# Patient Record
Sex: FEMALE | Race: ASIAN | NOT HISPANIC OR LATINO | URBAN - METROPOLITAN AREA
[De-identification: names, ages, dates, MRNs, and addresses within clinical notes are randomized per-mention and may not be internally consistent; named-entity substitution may affect disease eponyms.]

---

## 2020-04-06 ENCOUNTER — EMERGENCY (EMERGENCY)
Facility: HOSPITAL | Age: 55
LOS: 1 days | Discharge: HOME | End: 2020-04-06
Attending: ORTHOPAEDIC SURGERY
Payer: MEDICAID

## 2020-04-06 VITALS
HEART RATE: 98 BPM | TEMPERATURE: 101 F | OXYGEN SATURATION: 97 % | SYSTOLIC BLOOD PRESSURE: 152 MMHG | DIASTOLIC BLOOD PRESSURE: 93 MMHG | RESPIRATION RATE: 19 BRPM

## 2020-04-06 VITALS
HEART RATE: 90 BPM | TEMPERATURE: 100 F | SYSTOLIC BLOOD PRESSURE: 153 MMHG | RESPIRATION RATE: 18 BRPM | WEIGHT: 132.94 LBS | HEIGHT: 64 IN | DIASTOLIC BLOOD PRESSURE: 90 MMHG | OXYGEN SATURATION: 98 %

## 2020-04-06 PROCEDURE — 99284 EMERGENCY DEPT VISIT MOD MDM: CPT

## 2020-04-06 PROCEDURE — 99282 EMERGENCY DEPT VISIT SF MDM: CPT

## 2020-04-06 RX ORDER — ACETAMINOPHEN 500 MG
325 TABLET ORAL ONCE
Refills: 0 | Status: COMPLETED | OUTPATIENT
Start: 2020-04-06 | End: 2020-04-06

## 2020-04-06 RX ADMIN — Medication 325 MILLIGRAM(S): at 17:30

## 2020-04-06 NOTE — ED PROVIDER NOTE - CLINICAL SUMMARY MEDICAL DECISION MAKING FREE TEXT BOX
The patient does not have high-risk features of a life-threatening infection (COVID or otherwise). oxygen saturation is above 92 on Room air while resting as well as while ambulating. there are no signs of  impending respiratory failure.  Myself or the RN has had a long conversation with the patient regarding the reasons to return to the Emergency Department including but not limited to: worsening cough, shortness of breath, syncope, near-syncope, chest pain or any other concerns.   Patient was counseled extensively on self-quarantine protocol, hand washing, covering cough, cleansing of regularly used surfaces, return precautions, and supportive care measures to be taken.     Condolences given for family member who just

## 2020-04-06 NOTE — ED PROVIDER NOTE - NS ED ROS FT
GENERAL: +fever +malaise  Eyes: no visual changes  HEENT: No trouble swallowing or speaking  CARDIAC: No chest pain, edema  PULMONARY: +cough, no shortness of breath  GI:  +/- NVD  Neuro: no difficulty walking, no slurred speech  MSK: + myalgias  ROS as per HPI, otherwise negative

## 2020-04-06 NOTE — ED PROVIDER NOTE - PATIENT PORTAL LINK FT
You can access the FollowMyHealth Patient Portal offered by [facility] by registering at the following website: [email or web address]. By joining [employer]’s FollowMyHealth portal, you will also be able to view your health information using other applications (apps) compatible with our system.

## 2020-04-06 NOTE — ED PROVIDER NOTE - OBJECTIVE STATEMENT
ID [id number]    54 year old female speaks Occitan has hx of AFib on Eliquis not taking the medication x 3 days due to nausea not able to tolerate . Patient has hx of Fever taking Tylenol regularly , c/o cough with chest pains due to coughing , Patient has c/o cough worsts at night.  ID [id number]    54 year old female speaks Upper sorbian has hx of AFib on Eliquis not taking the medication x 3 days due to nausea not able to tolerate . Patient has hx of Fever taking Tylenol regularly , c/o cough with chest pains due to coughing x 8 days  , Patient has c/o cough worsts at night.

## 2020-04-06 NOTE — ED PROVIDER NOTE - NSFOLLOWUPINSTRUCTIONS_ED_ALL_ED_FT
YOU WERE SEEN IN THE ED FOR A LIKELY VIRAL ILLNESS. WE CANNOT PERFORM DEFINITIVE TESTING AT THIS TIME FOR THE NOVEL CORONAVIRUS. PLEASE READ THE INFORMATION PACKET PROVIDED TO YOU CAREFULLY.     YOU SHOULD SELF-QUARANTINE FOR 14 DAYS TO AVOID POTENTIAL SPREAD OF THE CORONAVIRUS.       YOU WILL BE CONTACTED BY [facility] OR YOUR LOCAL HEALTH DEPARTMENT AFTER YOUR DISCHARGE TO HELP GUIDE YOU IN YOUR QUARANTINE AND ADVISE YOU FURTHER.    Return to the ED for difficulty breathing.    You may over the counter acetaminophen (Tylenol) 650mg every 6 hours as needed for fever or pain. There is some concern in the medical community about using ibuprofen (Advil, Motrin) and other NSAIDs in people with COVID infections and until there is more research on this subject it may be best to avoid NSAIDs like ibuprofen at the moment unless you have an allergy to acetaminophen (Tylenol).  Do NOT exceed 3500mg acetaminophen in 24 hours.  Please do not take these medications if you do not have pain or fever or if you have any history of liver disease.    -------------    What is a coronavirus?  Coronaviruses are a large family of viruses that cause illnesses ranging from the common cold to more severe diseases such as Middle East Respiratory Syndrome (MERS) and Severe Acute Respiratory Syndrome (SARS).    What is Novel Coronavirus (COVID-19)?  The Centers for Disease Control and Prevention (CDC) is closely monitoring the outbreak caused by COVID-19. For the latest information about COVID-19, visit the CDC website at CDC.gov/Coronavirus    How are coronaviruses spread?  Coronaviruses can be transmitted from person to person, usually after close contact with an infected  person (for example, in a household, workplace, or healthcare setting), via droplets that become airborne after a cough or sneeze. These droplets can then infect a nearby person. Transmission can also occur by touching recently contaminated surfaces.    Is there a treatment for a COVID-19?  There is no specific treatment for disease caused by COVID-19. However, many of the symptoms can be treated based on the patient’s clinical condition. Supportive care for infected persons can be highly effective.    What are the symptoms of coronavirus infection?  It depends on the virus, but common signs include fever and/or respiratory symptoms such as cough and shortness of breath. In more severe cases, infection can cause pneumonia, severe acute respiratory syndrome, kidney failure and even death. Fortunately, most cases of COVID-19 have an illness no different than the influenza (flu), with a majority of these patients having mild symptoms and overall mortality which appears to be not much different than the flu.    What can I do to protect myself?  The best precautionary measures:  – washing your hands  – covering your cough  – disinfecting surfaces  – it is also advisable to avoid close contact with anyone showing symptoms of respiratory illness such as coughing and sneezing  – those with symptoms should wear a surgical mask when around others    What can I do to protect those around me?  If you have been identified as someone who may be infected with COVID-19, we recommend you follow the self-isolation procedures outlined on the following page to protect those around you and to limit the spread of this virus.    We recommend the below precautionary steps from now until 14 days from when you returned from your travel or date of your last known possible contact:    — Do not go to work, school or public areas. Avoid using public transportation, ridesharing or taxis.  — As much as possible, separate yourself from other people in your home. If you can, you should stay in a room and away from other people. Also, you should use a separate bathroom if available.  — Wear the supplied mask whenever you are around other people.  — If you have a non-urgent medical appointment, please reschedule for a later date. If the appointment is urgent, please call the health care provider and tell them that you are on self-isolation for possible COVID-19. This will help the health care provider’s office take steps to keep other people from getting infected or exposed. If you can reschedule routine appointments, do so.  — Wash your hands often with soap and water for at least 15 to 20 seconds or clean your hands with an alcohol-based hand  that contains 60 to 95% alcohol, covering all surfaces of your hands and rubbing them together until they feel dry. Soap and water should be used preferentially if hands are visibly dirty.  — Cover your mouth and nose with a tissue when you cough or sneeze. Throw used tissues in a lined trash can. Immediately wash your hands.  — Avoid touching your eyes, nose, and mouth with your hands.  — Avoid sharing personal household items. You should not share dishes, drinking glasses, cups, eating utensils, towels, or bedding with other people or pets in your home. After using these items, they should be washed thoroughly with soap and water.  — Clean and disinfect all “high-touch” surfaces every day. High touch surfaces include counters, tabletops, doorknobs, light switches, remote controls, bathroom fixtures, toilets, phones, keyboards, tablets, and bedside tables. Also, clean any surfaces that may have blood, stool, or body fluids on them.

## 2020-04-06 NOTE — ED ADULT NURSE NOTE - OBJECTIVE STATEMENT
55 y/o Khmer speaking female with PMH significant for AFIB on Eliquis presenting to the ED complaining of nausea, fever, cough and chest pain x 8 days. Patient states brother in law passed away 2 hours ago from COVID and pt has been in contact with him. Patient states has not been taking Eliquis for 3 days due to nausea. Patient states chest pain is only present on coughing. Tmax 102. Patient taking Tylenol with relief. On assessment patient speaking in full sentences. SPO2 98% on room air. A&OX3.Safety and comfort measures provided. 55 y/o Maori speaking female with PMH significant for AFIB on Eliquis presenting to the ED complaining of nausea, fever, cough and chest pain x 8 days. Patient states brother in law passed away 2 hours ago from COVID and pt has been in contact with him. Patient states has not been taking Eliquis for 3 days due to nausea. Patient states chest pain is only present on coughing. Tmax 102. Patient taking Tylenol with relief. On assessment patient speaking in full sentences. SPO2 98% on room air resting and 97% room air ambulatory. Patient febrile in ED. A&OX3.Safety and comfort measures provided.

## 2020-04-06 NOTE — ED ADULT TRIAGE NOTE - CHIEF COMPLAINT QUOTE
Exposure to COVID  Pt s brother in law  due to COVID just one hour ago .  Pt has fever ,cough ,chills, Generalized body pain ,  X 1 week

## 2022-09-22 ENCOUNTER — EMERGENCY (EMERGENCY)
Facility: HOSPITAL | Age: 57
LOS: 1 days | Discharge: HOME | End: 2022-09-22
Attending: EMERGENCY MEDICINE
Payer: MEDICAID

## 2022-09-22 VITALS
RESPIRATION RATE: 17 BRPM | OXYGEN SATURATION: 100 % | SYSTOLIC BLOOD PRESSURE: 171 MMHG | HEART RATE: 70 BPM | WEIGHT: 130.07 LBS | DIASTOLIC BLOOD PRESSURE: 97 MMHG | HEIGHT: 64 IN | TEMPERATURE: 98 F

## 2022-09-22 PROCEDURE — 99284 EMERGENCY DEPT VISIT MOD MDM: CPT | Mod: 25

## 2022-09-22 PROCEDURE — 70450 CT HEAD/BRAIN W/O DYE: CPT | Mod: 26,MF

## 2022-09-22 PROCEDURE — G1004: CPT

## 2022-09-22 PROCEDURE — 99284 EMERGENCY DEPT VISIT MOD MDM: CPT

## 2022-09-22 PROCEDURE — 70450 CT HEAD/BRAIN W/O DYE: CPT | Mod: MF

## 2022-09-22 RX ORDER — ONDANSETRON 8 MG/1
8 TABLET, FILM COATED ORAL ONCE
Refills: 0 | Status: COMPLETED | OUTPATIENT
Start: 2022-09-22 | End: 2022-09-22

## 2022-09-22 RX ORDER — ACETAMINOPHEN 500 MG
975 TABLET ORAL ONCE
Refills: 0 | Status: COMPLETED | OUTPATIENT
Start: 2022-09-22 | End: 2022-09-22

## 2022-09-22 RX ADMIN — Medication 975 MILLIGRAM(S): at 12:16

## 2022-09-22 RX ADMIN — ONDANSETRON 8 MILLIGRAM(S): 8 TABLET, FILM COATED ORAL at 12:17

## 2022-09-22 NOTE — ED PROVIDER NOTE - PHYSICAL EXAMINATION
Const: not in acute distress  Eyes: no conjunctival injection  HEENT: hematoma present on left lateral posterior head. Moist MM.  Neck: Trachea midline.   CVS: +S1/S2, Peripheral pulses 2+ and equal in all extremities.  RESP: Unlabored respiratory effort. Clear to auscultation bilaterally.  GI: Nontender/Nondistended, No CVA tenderness b/l.   MSK: Normocephalic/Atraumatic, No Lower Extremities edema b/l.   Skin: Intact.   Neuro: CNs II-XII grossly intact. Motor & Sensation grossly intact.  Psych: Awake, Alert, & Oriented (AAO) x3.

## 2022-09-22 NOTE — ED PROVIDER NOTE - NSFOLLOWUPINSTRUCTIONS_ED_ALL_ED_FT
You presented to the emergency room today with head trauma after a fall. Based on your symptoms, it is likely that you have sustained a concussion. A concussion is a brain injury from a direct hit (blow) to the head or body. This blow causes the brain to shake quickly back and forth inside the skull. This can damage brain cells and cause chemical changes in the brain. A concussion may also be known as a mild traumatic brain injury (TBI). Concussions are usually not life-threatening, but the effects of a concussion can be serious. If your child has a concussion, he or she is more likely to experience concussion-like symptoms after a direct blow to the head in the future.    What are the causes?  This condition is caused by:    A direct blow to the head, such as from running into another player during a game, being hit in a fight, or falling and hitting the head on a hard surface.  A jolt of the head or neck that causes the brain to move back and forth inside the skull, such as in a car crash.    What are the signs or symptoms?  The signs of a concussion can be hard to notice. Early on, they may be missed by you, family members, and health care providers. Your child may look fine but act or seem different.    Symptoms are usually temporary, but they may last for days, weeks, or even longer. Some symptoms may appear right away but other symptoms may not show up for hours or days. Every head injury is different. Symptoms may include:    Headaches. This can include a feeling of pressure in the head.  Memory problems.  Trouble concentrating, organizing, or making decisions.  Slowness in thinking, acting, speaking, or reading.  Confusion.  Fatigue.  Changes in eating or sleeping patterns.  Problems with coordination or balance.  Nausea or vomiting.  Numbness or tingling.  Sensitivity to light or noise.  Vision or hearing problems.  Reduced sense of smell.  Irritability or mood changes.  Dizziness.  Lack of motivation.  Seeing or hearing things that other people do not see or hear (hallucinations).    How is this diagnosed?  This condition is diagnosed based on:    Your child's symptoms.  A description of your child's injury.    Your child may also have tests, including:    Imaging tests, such as a CT scan or MRI. These are done to look for signs of brain injury.  Neuropsychological tests. These measure your child's thinking, understanding, learning, and remembering abilities.    How is this treated?  This condition is treated with physical and mental rest and careful observation, usually at home. If the concussion is severe, your child may need to stay home from school for a while.  Your child may be referred to a concussion clinic or to other health care providers for management.  It is important to tell your child's health care provider if your child is taking any medicines, including prescription medicines, over-the-counter medicines, and natural remedies. Some medicines, such as blood thinners (anticoagulants) and aspirin, may increase the chance of complications, such as bleeding.  How fast your child will recover from a concussion depends on many factors, such as how severe the concussion is, what part of the brain was injured, how old your child is, and how healthy your child was before the concussion.  Recovery can take time. It is important for your child to wait to return to activity until a health care provider says it is safe to do that and your child's symptoms are completely gone.  Follow these instructions at home:  Activity     Limit your child's activities that require a lot of thought or focused attention, such as:    Watching TV.  Playing memory games and puzzles.  Doing homework.  Working on the computer.    Rest. Rest helps the brain to heal. Make sure your child:    Gets plenty of sleep at night. Avoid having your child stay up late at night.  Keeps the same bedtime hours on weekends and weekdays.  Rests during the day. Have him or her take naps or rest breaks when he or she feels tired.    Having another concussion before the first one has healed can be dangerous. Keep your child away from high-risk activities that could cause a second concussion, such as:    Riding a bicycle.  Playing sports.  Participating in gym class or recess activities.  Climbing on playground equipment.    Ask your child's health care provider when it is safe for your child to return to her or his regular activities. Your child's ability to react may be slower after a brain injury. Your child's health care provider will likely give you a plan for gradually having your child return to activities.  General instructions     Watch your child carefully for new or worsening symptoms.  Encourage your child to get plenty of rest.  Give over-the-counter and prescription medicines only as told by your child's health care provider.  Inform all of your child's teachers and other caregivers about your child's injury, symptoms, and activity restrictions. Tell them to report any new or worsening problems.  Keep all follow-up visits as told by your child's health care provider. This is important.  How is this prevented?  It is very important to avoid another brain injury, especially as your child recovers. In rare cases, another injury can lead to permanent brain damage, brain swelling, or death. The risk of this is greatest during the first 7–10 days after a head injury. Avoid injuries by having your child:    Wear a seat belt when riding in a car.  Wear a helmet when biking, skiing, skateboarding, skating, or doing similar activities.  Avoid activities that could lead to a second concussion, such as contact sports or recreational sports, until your child's health care provider says it is okay.    You can also take safety measures in your home, such as:    Removing clutter and tripping hazards from floors and stairways.  Having your child use grab bars in bathrooms and handrails by stairs.  Placing non-slip mats on floors and in bathtubs.  Improving lighting in dim areas.    Contact a health care provider if:  Your child’s symptoms get worse.  Your child develops new symptoms.  Your child continues to have symptoms for more than 2 weeks.  Get help right away if:  The pupil of one of your child's eyes is larger than the other.  Your child loses consciousness.  Your child cannot recognize people or places.  It is difficult to wake your child or your child is sleepier.  Your child has slurred speech.  Your child has a seizure or convulsions.  Your child has severe or worsening headaches.  Your child's fatigue, confusion, or irritability gets worse.  Your child keeps vomiting.  Your child will not stop crying.  Your child's behavior changes significantly.  Your child refuses to eat.  Your child has weakness or numbness in any part of the body.  Your child's coordination gets worse.  Your child has neck pain.  Summary  A concussion is a brain injury from a direct hit (blow) to the head or body.  A concussion may also be called a mild traumatic brain injury (TBI).  Your child may have imaging tests and neuropsychological tests to diagnose a concussion.  This condition is treated with physical and mental rest and careful observation.  Ask your child's health care provider when it is safe for your child to return to his or her regular activities. Have your child follow safety instructions as told by his or her health care provider.  This information is not intended to replace advice given to you by your health care provider. Make sure you discuss any questions you have with your health care provider. You presented to the emergency room today with head trauma after a fall. Based on your symptoms, it is likely that you have sustained a concussion. You received a CT scan of your head which did not show any acute bleed. If your symptoms worsen, you have worsening nausea, focal weakness or numbness, please come back to the emergency room. You can use tylenol as prescribed on the bottle for pain control.    You can also follow up at the [facility] concussion clinic,   [phone]    A concussion is a brain injury from a direct hit (blow) to the head or body. This blow causes the brain to shake quickly back and forth inside the skull. This can damage brain cells and cause chemical changes in the brain. A concussion may also be known as a mild traumatic brain injury (TBI). Concussions are usually not life-threatening, but the effects of a concussion can be serious. If your child has a concussion, he or she is more likely to experience concussion-like symptoms after a direct blow to the head in the future.  What are the signs or symptoms?  The signs of a concussion can be hard to notice. Early on, they may be missed by you, family members, and health care providers. Your child may look fine but act or seem different.    Symptoms are usually temporary, but they may last for days, weeks, or even longer. Some symptoms may appear right away but other symptoms may not show up for hours or days. Every head injury is different. Symptoms may include:    Headaches. This can include a feeling of pressure in the head.  Memory problems.  Trouble concentrating, organizing, or making decisions.  Slowness in thinking, acting, speaking, or reading.  Confusion.  Fatigue.  Changes in eating or sleeping patterns.  Problems with coordination or balance.  Nausea or vomiting.  Numbness or tingling.  Sensitivity to light or noise.  Vision or hearing problems.  Reduced sense of smell.  Irritability or mood changes.  Dizziness.  Lack of motivation.  Seeing or hearing things that other people do not see or hear (hallucinations).

## 2022-09-22 NOTE — ED ADULT NURSE NOTE - OBJECTIVE STATEMENT
57 y female with PMHx afib s/p ablation, was on eliquis 3 years ago but no longer is taking it presenting with head injury s/p fall. As per daughter pt had a Mechanical fall while pt was walking dog when dog jerked and dragged her. Patient fell on posterior left lateral head. +LOC for a few minutes. Patient was able to ambulate with support after fall. Since fall, patient endorsing headache, dizziness, sensation of disorientation, sensitivity to light, blurry vision, nausea without vomiting. Patient denies fever, CP, SOB, and abdominal pain.

## 2022-09-22 NOTE — ED PROVIDER NOTE - NS ED ROS FT
CONST: no fevers, no chills, (+) trauma  EYES: (+) blurry vision, sensitivity to light  ENT: no sore throat  CV: no chest pain, no extremity swelling  RESP: no shortness of breath  ABD: no abdominal pain, (+) nausea, no vomiting, no diarrhea, no melena  : no dysuria, no hematuria, no frequency  MSK: no back pain, no neck pain, no extremity pain  NEURO: (+) headache, no focal weakness, (+) dizziness  HEME: no bruising  SKIN: no rash

## 2022-09-22 NOTE — ED PROVIDER NOTE - PATIENT PORTAL LINK FT
You can access the FollowMyHealth Patient Portal offered by [facility] by registering at the following website: [email or web address]. By joining [employer]’s FollowMyHealth portal, you will also be able to view your health information using other applications (apps) compatible with our system.

## 2022-09-22 NOTE — ED ADULT TRIAGE NOTE - TEMPERATURE IN CELSIUS (DEGREES C)
[FreeTextEntry1] : follow up for  Graves disease. \par she has colitis and IBS. \par NTMNG s/p FNA 2014. \par she was seen in ER for possible CVA and she signed AMA \par \par thyroid uptake/scan shows uptake 65 %     36.8

## 2022-09-22 NOTE — ED PROVIDER NOTE - ATTENDING CONTRIBUTION TO CARE
This is a 57-year-old female who was pulled by the dog and struck the back of her head.  She is not on aspirin or any blood thinners.  She did not lose consciousness.  She has not vomited.  She is back to her baseline but she does have a headache.  No neck pain.  No pain to her arms or legs.  No spinal tenderness.  There is a small hematoma posteriorly.  I advised the patient that she is at low risk for intracranial hemorrhage related to her injury.  Patient is very concerned as she has had another family member die of such brain bleed.  We will do a CT scan for this reason.  Pain control and reassess.

## 2022-09-22 NOTE — ED PROVIDER NOTE - OBJECTIVE STATEMENT
56 yo female with PMHx afib/aflutter? s/p ablation, was on eliquis 3 years ago, presenting with head injury s/p fall. Mechanical fall. Patient was walking dog when dog jerked and dragged her. Patient fell on posterior left lateral head. +LOC for a few minutes. Patient was able to ambulate with support after fall. Since fall, patient endorsing headache, dizziness, sensation of disorientation, sensitivity to light, blurry vision, nausea without vomiting. Patient denies fever, CP, SOB, and abdominal pain. 58 yo female with PMHx afib/aflutter? s/p ablation, was on eliquis 3 years ago, presenting with head injury s/p fall @ 10AM. Mechanical fall. Patient was walking dog when dog jerked and dragged her. Patient fell on posterior left lateral head. +LOC for a few minutes. Patient was able to ambulate with support after fall. Since fall, patient endorsing headache, dizziness, sensation of disorientation, sensitivity to light, blurry vision, nausea without vomiting. Patient denies fever, CP, SOB, and abdominal pain.

## 2022-09-22 NOTE — ED PROVIDER NOTE - CLINICAL SUMMARY MEDICAL DECISION MAKING FREE TEXT BOX
56 yo female with PMHx afib/aflutter? s/p ablation, was on eliquis 3 years ago, presenting with head injury s/p fall. Ddx includes but not limited to concussion vs head bleed. 56 yo female with PMHx afib/aflutter? s/p ablation, was on eliquis 3 years ago, presenting with head injury s/p fall. Most likely concussion. Less likely head bleed, [place] CT suggest head CT not necessarily. Will treat dizziness and pain. 56 yo female with PMHx afib/aflutter? s/p ablation, was on eliquis 3 years ago, presenting with head injury s/p fall. Most likely concussion. Less likely head bleed, [place]e CT suggest head CT not necessary. Will treat pain and nausea.